# Patient Record
Sex: MALE | Race: WHITE | NOT HISPANIC OR LATINO | URBAN - METROPOLITAN AREA
[De-identification: names, ages, dates, MRNs, and addresses within clinical notes are randomized per-mention and may not be internally consistent; named-entity substitution may affect disease eponyms.]

---

## 2017-04-27 ENCOUNTER — OFFICE VISIT - HEALTHEAST (OUTPATIENT)
Dept: FAMILY MEDICINE | Facility: CLINIC | Age: 57
End: 2017-04-27

## 2017-04-27 ENCOUNTER — SURGERY - HEALTHEAST (OUTPATIENT)
Dept: CARDIOLOGY | Facility: CLINIC | Age: 57
End: 2017-04-27

## 2017-04-27 DIAGNOSIS — R07.9 CHEST PAIN: ICD-10-CM

## 2017-04-27 LAB
ATRIAL RATE - MUSE: 312 BPM
ATRIAL RATE - MUSE: 76 BPM
DIASTOLIC BLOOD PRESSURE - MUSE: NORMAL MMHG
DIASTOLIC BLOOD PRESSURE - MUSE: NORMAL MMHG
INTERPRETATION ECG - MUSE: NORMAL
INTERPRETATION ECG - MUSE: NORMAL
P AXIS - MUSE: 2 DEGREES
P AXIS - MUSE: NORMAL DEGREES
PR INTERVAL - MUSE: 240 MS
PR INTERVAL - MUSE: NORMAL MS
QRS DURATION - MUSE: 112 MS
QRS DURATION - MUSE: 114 MS
QT - MUSE: 380 MS
QT - MUSE: 392 MS
QTC - MUSE: 441 MS
QTC - MUSE: 454 MS
R AXIS - MUSE: -18 DEGREES
R AXIS - MUSE: 81 DEGREES
SYSTOLIC BLOOD PRESSURE - MUSE: NORMAL MMHG
SYSTOLIC BLOOD PRESSURE - MUSE: NORMAL MMHG
T AXIS - MUSE: -38 DEGREES
T AXIS - MUSE: 94 DEGREES
VENTRICULAR RATE- MUSE: 76 BPM
VENTRICULAR RATE- MUSE: 86 BPM

## 2017-04-28 ASSESSMENT — MIFFLIN-ST. JEOR: SCORE: 1661.5

## 2017-04-29 ASSESSMENT — MIFFLIN-ST. JEOR: SCORE: 1625.47

## 2017-05-16 ENCOUNTER — COMMUNICATION - HEALTHEAST (OUTPATIENT)
Dept: CARDIOLOGY | Facility: CLINIC | Age: 57
End: 2017-05-16

## 2017-06-02 ENCOUNTER — OFFICE VISIT - HEALTHEAST (OUTPATIENT)
Dept: CARDIOLOGY | Facility: CLINIC | Age: 57
End: 2017-06-02

## 2017-06-02 DIAGNOSIS — I25.10 CORONARY ARTERY DISEASE DUE TO LIPID RICH PLAQUE: ICD-10-CM

## 2017-06-02 DIAGNOSIS — I21.9 MYOCARDIAL INFARCTION (H): ICD-10-CM

## 2017-06-02 DIAGNOSIS — E78.5 DYSLIPIDEMIA, GOAL LDL BELOW 70: ICD-10-CM

## 2017-06-02 DIAGNOSIS — I25.83 CORONARY ARTERY DISEASE DUE TO LIPID RICH PLAQUE: ICD-10-CM

## 2017-06-02 DIAGNOSIS — D64.9 ANEMIA: ICD-10-CM

## 2017-06-02 LAB — LDLC SERPL CALC-MCNC: 55 MG/DL

## 2017-06-02 ASSESSMENT — MIFFLIN-ST. JEOR: SCORE: 1646.33

## 2017-06-05 ENCOUNTER — COMMUNICATION - HEALTHEAST (OUTPATIENT)
Dept: CARDIOLOGY | Facility: CLINIC | Age: 57
End: 2017-06-05

## 2017-07-06 ENCOUNTER — COMMUNICATION - HEALTHEAST (OUTPATIENT)
Dept: CARDIOLOGY | Facility: CLINIC | Age: 57
End: 2017-07-06

## 2017-07-10 ENCOUNTER — AMBULATORY - HEALTHEAST (OUTPATIENT)
Dept: CARDIOLOGY | Facility: CLINIC | Age: 57
End: 2017-07-10

## 2017-08-29 ENCOUNTER — RECORDS - HEALTHEAST (OUTPATIENT)
Dept: ADMINISTRATIVE | Facility: OTHER | Age: 57
End: 2017-08-29

## 2017-09-01 ENCOUNTER — OFFICE VISIT - HEALTHEAST (OUTPATIENT)
Dept: CARDIOLOGY | Facility: CLINIC | Age: 57
End: 2017-09-01

## 2017-09-01 DIAGNOSIS — E78.5 DYSLIPIDEMIA, GOAL LDL BELOW 70: ICD-10-CM

## 2017-09-01 DIAGNOSIS — I25.83 CORONARY ARTERY DISEASE DUE TO LIPID RICH PLAQUE: ICD-10-CM

## 2017-09-01 DIAGNOSIS — I25.10 CORONARY ARTERY DISEASE DUE TO LIPID RICH PLAQUE: ICD-10-CM

## 2017-09-01 RX ORDER — NITROGLYCERIN 0.4 MG/1
0.4 TABLET SUBLINGUAL EVERY 5 MIN PRN
Qty: 25 TABLET | Refills: 2 | Status: SHIPPED | OUTPATIENT
Start: 2017-09-01 | End: 2018-09-01

## 2017-09-01 ASSESSMENT — MIFFLIN-ST. JEOR: SCORE: 1623.65

## 2018-02-26 ENCOUNTER — COMMUNICATION - HEALTHEAST (OUTPATIENT)
Dept: CARDIOLOGY | Facility: CLINIC | Age: 58
End: 2018-02-26

## 2018-03-02 ENCOUNTER — COMMUNICATION - HEALTHEAST (OUTPATIENT)
Dept: ADMINISTRATIVE | Facility: CLINIC | Age: 58
End: 2018-03-02

## 2018-06-15 ENCOUNTER — AMBULATORY - HEALTHEAST (OUTPATIENT)
Dept: CARDIOLOGY | Facility: CLINIC | Age: 58
End: 2018-06-15

## 2018-06-15 ENCOUNTER — COMMUNICATION - HEALTHEAST (OUTPATIENT)
Dept: TELEHEALTH | Facility: CLINIC | Age: 58
End: 2018-06-15

## 2018-06-15 DIAGNOSIS — E78.5 DYSLIPIDEMIA, GOAL LDL BELOW 70: ICD-10-CM

## 2018-06-15 LAB
CHOLEST SERPL-MCNC: 112 MG/DL
FASTING STATUS PATIENT QL REPORTED: ABNORMAL
HDLC SERPL-MCNC: 39 MG/DL
LDLC SERPL CALC-MCNC: 65 MG/DL
TRIGL SERPL-MCNC: 40 MG/DL

## 2018-06-22 ENCOUNTER — OFFICE VISIT - HEALTHEAST (OUTPATIENT)
Dept: CARDIOLOGY | Facility: CLINIC | Age: 58
End: 2018-06-22

## 2018-06-22 DIAGNOSIS — I25.10 CORONARY ARTERY DISEASE DUE TO LIPID RICH PLAQUE: ICD-10-CM

## 2018-06-22 DIAGNOSIS — I25.83 CORONARY ARTERY DISEASE DUE TO LIPID RICH PLAQUE: ICD-10-CM

## 2018-06-22 DIAGNOSIS — E78.5 DYSLIPIDEMIA, GOAL LDL BELOW 70: ICD-10-CM

## 2018-06-22 RX ORDER — LISINOPRIL 5 MG/1
5 TABLET ORAL DAILY
Status: SHIPPED | COMMUNITY
Start: 2018-06-22

## 2018-06-22 ASSESSMENT — MIFFLIN-ST. JEOR: SCORE: 1664.48

## 2020-08-13 ENCOUNTER — RECORDS - HEALTHEAST (OUTPATIENT)
Dept: ADMINISTRATIVE | Facility: OTHER | Age: 60
End: 2020-08-13

## 2020-08-13 ENCOUNTER — AMBULATORY - HEALTHEAST (OUTPATIENT)
Dept: CARDIOLOGY | Facility: CLINIC | Age: 60
End: 2020-08-13

## 2020-08-20 ENCOUNTER — COMMUNICATION - HEALTHEAST (OUTPATIENT)
Dept: CARDIOLOGY | Facility: CLINIC | Age: 60
End: 2020-08-20

## 2020-08-21 ENCOUNTER — OFFICE VISIT - HEALTHEAST (OUTPATIENT)
Dept: CARDIOLOGY | Facility: CLINIC | Age: 60
End: 2020-08-21

## 2020-08-21 DIAGNOSIS — I25.10 CORONARY ARTERY DISEASE DUE TO LIPID RICH PLAQUE: ICD-10-CM

## 2020-08-21 DIAGNOSIS — I25.83 CORONARY ARTERY DISEASE DUE TO LIPID RICH PLAQUE: ICD-10-CM

## 2020-08-21 DIAGNOSIS — E78.5 DYSLIPIDEMIA, GOAL LDL BELOW 70: ICD-10-CM

## 2020-08-21 RX ORDER — METOPROLOL SUCCINATE 25 MG/1
25 TABLET, EXTENDED RELEASE ORAL DAILY
Status: SHIPPED | COMMUNITY
Start: 2020-07-06

## 2020-08-21 RX ORDER — CHLORAL HYDRATE 500 MG
1 CAPSULE ORAL DAILY
Status: SHIPPED | COMMUNITY
Start: 2020-08-21

## 2020-08-21 ASSESSMENT — MIFFLIN-ST. JEOR: SCORE: 1699.63

## 2021-05-30 VITALS — BODY MASS INDEX: 28.1 KG/M2 | HEIGHT: 68 IN | WEIGHT: 185.4 LBS

## 2021-05-31 VITALS — HEIGHT: 68 IN | WEIGHT: 185 LBS | BODY MASS INDEX: 28.04 KG/M2

## 2021-05-31 VITALS — WEIGHT: 190 LBS | HEIGHT: 68 IN | BODY MASS INDEX: 28.79 KG/M2

## 2021-06-01 VITALS — HEIGHT: 68 IN | BODY MASS INDEX: 29.4 KG/M2 | WEIGHT: 194 LBS

## 2021-06-04 VITALS
DIASTOLIC BLOOD PRESSURE: 86 MMHG | HEIGHT: 69 IN | WEIGHT: 200 LBS | RESPIRATION RATE: 16 BRPM | SYSTOLIC BLOOD PRESSURE: 138 MMHG | BODY MASS INDEX: 29.62 KG/M2 | HEART RATE: 72 BPM

## 2021-06-09 ENCOUNTER — RECORDS - HEALTHEAST (OUTPATIENT)
Dept: CARDIOLOGY | Facility: CLINIC | Age: 61
End: 2021-06-09

## 2021-06-10 NOTE — PROGRESS NOTES
[facility] Office Visit    Chief Complaint:  Chief Complaint   Patient presents with     Chest Pain         Assessment/Plan:  1. Chest pain  Concerning for a life threatening cardiac event- MI/disection, etc.  No further evaluation indicated in clinic setting.  Pt was stabilized and sent to [facility] ED for further evaluation and treatment by EMS.  He received 0.4mg SL NTG x2 in clinic setting, O2, and took asa prior to arrival at clinic.  EKG was poor quality but did show some ST changes.    - Electrocardiogram Perform - Clinic  - nitroglycerin SL tablet 0.4 mg (NITROSTAT); Place 1 tablet (0.4 mg total) under the tongue every 5 (five) minutes as needed for chest pain.  Administrations This Visit     nitroglycerin SL tablet 0.4 mg (NITROSTAT)     Admin Date Action Dose Route Administered By             04/27/2017 Given 0.8 mg Sublingual Luis Raymond CMA                        HPI:   Shar Melo is a 57 y.o. male c/o tightness in his chest and cold sweats.  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  Pt chewed 3 - 325 mg tablets asa while on his route.  Starting around 1:30 he developed a cold sweat. He notes that his symptoms were mild unless he was carrying a heavy box or going up stairs and then his tightness got bad.  He knew he had a delivery at [facility] and upon arriving came in and asked for medical evaluation for possible heart attack.  He was brought back to an exam room immediately.  He notes he smokes 1ppd and has white coat HTN but is not on any medications.  He sees a PMD in [place].      We got him laying down on the exam table.  He was given 15L O2 by mask.  Initial BP was 178/130.   He was given 0.4mg NTG SL and this was repeated 5 minutes later due to ongoing symptoms.  While he was hear he complained of a severe tightness in his chest that radiated to his throat, arms, and a severe  headache.  He also complained of tingling in both of his arms.  911 was contacted quickly while he was being evaluated.      Physical Exam:  BP (!) 178/130  Pulse 81  SpO2 100% There is no height or weight on file to calculate BMI. No LMP for male patient.  Vital signs reviewed  Wt Readings from Last 3 Encounters:   04/27/17 188 lb (85.3 kg)     History   Smoking Status     Current Every Day Smoker     Packs/day: 1.00     Types: Cigarettes   Smokeless Tobacco     Not on file     History   Sexual Activity     Sexual activity: Not on file     No Data Recorded  No Data Recorded  No Data Recorded  No Data Recorded    All normal as below except abnormalities include: pt appears nervous/frantic, uncomfortable, short of breath, cold, clammy, very pale, sweaty.  Has a hard time laying still.  Notes increased symptoms with movement.    Neck: Supple without lymphadenopathy or thyromegally  CV: Regular rate and rhythm S1S2 without rubs, murmurs or gallops,   Lungs: Clear to auscultation bilaterally  Abd:  +BS, soft NT/ND,  No masses or organomegally  Extremities: cold, No Edema, 2+ Pedal and radial pulses bilaterally  Skin: No lesions or rashes noted  Neuro/MSK: Able to ambulate around the exam room with equal movement, strength and normal coordination of the upper and lower extremeties symmetrically    No results found for this or any previous visit.    ROS:  10 point review of symptoms all negative except as outlined in the HPI above.    Med list and active problem list reviewed and updated as part of this encounter    Current Facility-Administered Medications on File Prior to Visit   Medication Dose Route Frequency Provider Last Rate Last Dose     aspirin chewable tablet 162 mg  162 mg Oral Once Kayli Olivera MD         heparin injection 5,100 Units  60 Units/kg Intravenous Once Kayli Olivera MD         [COMPLETED] morphine 4 mg/mL injection              morphine injection 2-4 mg  2-4 mg Intravenous  PRN Kayli Olivera MD         naloxone injection 0.2-0.4 mg (NARCAN)  0.2-0.4 mg Intravenous PRN Kayli Olivera MD        Or     naloxone injection 0.2-0.4 mg (NARCAN)  0.2-0.4 mg Intramuscular PRN Kayli Olivera MD         nitroglycerin 25 mg/250 mL in D5W (0.1 mg/mL)  5-100 mcg/min Intravenous Continuous Kayli Olivera MD         [COMPLETED] ticagrelor tablet 180 mg (BRILINTA)  180 mg Oral Once Kayli Olivera MD   180 mg at 04/27/17 7235     No current outpatient prescriptions on file prior to visit.         Laura France MD    This document was created using voice recognition software which may contain typographical errors.

## 2021-06-10 NOTE — TELEPHONE ENCOUNTER
Left message for patient requesting call back to complete Wellness screen prior to [facility] clinic appt tomorrow.  mg

## 2021-06-10 NOTE — TELEPHONE ENCOUNTER
Wellness Screening Tool  Symptom Screening:  Do you have one of the following NEW symptoms:    Fever (subjective or >100.0)?  No    A new cough?  No    Shortness of breath?  No     Chills? No     New loss of taste or smell? No     Generalized body aches? No     New persistent headache? No     New sore throat? No     Nausea, vomiting, or diarrhea?  No    Within the past 3 weeks, have you been exposed to someone with a known positive illness below:    COVID-19 (known or suspected)?  No    Chicken pox?  No    Mealses?  No    Pertussis?  No    Patient notified of visitor policy- They may have one person accompany them to their appointment, but they will need to wear a mask and will be screened upon arrival for symptoms: Yes  Pt informed to wear a mask: Yes  Pt notified if they develop any symptoms listed above, prior to their appointment, they are to call the clinic directly at [phone] for further instructions.  Yes  Patient's appointment status: Patient will be seen in clinic as scheduled on Fri 8-21-20 @ 1250 in  clinic with Dr. Tanner.  mg

## 2021-06-10 NOTE — PATIENT INSTRUCTIONS - HE
Shar,    It was a pleasure to see you today at [facility].    You will see Dr. Tanner in two years; please bring a copy of your most recent lipid profile.     Please call us if you have any questions or concerns about your heart.      Victor Hugo Tanner M.D.  [facility]

## 2021-06-15 PROBLEM — I25.83 CORONARY ARTERY DISEASE DUE TO LIPID RICH PLAQUE: Chronic | Status: ACTIVE | Noted: 2017-04-27

## 2021-06-15 PROBLEM — E78.5 DYSLIPIDEMIA, GOAL LDL BELOW 70: Chronic | Status: ACTIVE | Noted: 2017-04-27

## 2021-06-15 PROBLEM — I25.10 CORONARY ARTERY DISEASE DUE TO LIPID RICH PLAQUE: Chronic | Status: ACTIVE | Noted: 2017-04-27

## 2021-06-16 ENCOUNTER — RECORDS - HEALTHEAST (OUTPATIENT)
Dept: CARDIOLOGY | Facility: HOSPITAL | Age: 61
End: 2021-06-16

## 2021-06-16 DIAGNOSIS — I25.10 CAD (CORONARY ARTERY DISEASE): ICD-10-CM

## 2021-06-25 NOTE — PROGRESS NOTES
Progress Notes by Vitaly Tanner MD at 9/1/2017 10:10 AM     Author: Vitaly Tanner MD Service: -- Author Type: Physician    Filed: 9/1/2017 10:43 AM Encounter Date: 9/1/2017 Status: Signed    : Vitaly Tanner MD (Physician)           Click to link to [facility]     [facility] Clinic Note      Assessment:    1. Coronary artery disease due to lipid rich plaque  nitroglycerin (NITROSTAT) 0.4 MG SL tablet   2. Dyslipidemia, goal LDL below 70  Lipid Profile       Plan:    1.  Shar did not have nitroglycerin at home, so I prescribed it and explained how to use it.  2.  Continue current cardiovascular medications and return to see Dr. Tanner next spring after new lipid profile.  He may discontinue Brilinta on or around the one-year anniversary of his coronary stenting.    An After Visit Summary was printed and given to the patient.    Subjective:    Shar Melo returned for a planned 3 month follow up visit.    He has resumed driving for work.  He does not have any cardiac symptoms this time.  He does experience brief twinges of discomfort in the pectoral muscles that lasts less than 2 seconds.  He denies shortness of breath, lightheadedness or edema.    He does experience nocturia as well as heartburn after certain foods.    Shar has not noticed any difficulties with taking his cardiovascular medications.    Past Medical History:    Patient Active Problem List   Diagnosis   ? Coronary artery disease due to lipid rich plaque   ? Dyslipidemia, goal LDL below 70       Past Medical History:   Diagnosis Date   ? Acute ST elevation myocardial infarction (STEMI) involving right coronary artery 4/27/2017   ? Coronary artery disease due to lipid rich plaque 4/27/2017   ? Dyslipidemia, goal LDL below 70 4/27/2017   ? White coat hypertension        Past Surgical History:   Procedure Laterality Date   ? CORONARY STENT PLACEMENT  04/27/2017    LOW to prox RCA by Dr. Mondragon   ? HAND SURGERY  Right    ? DE CATH PLACEMENT & NJX CORONARY ART ANGIO IMG S&I N/A 4/27/2017    Procedure: Coronary Angiogram;  Surgeon: Ayo Mondragon MD;  Location: [facility] Cath Lab;  Service: Cardiology   ? DE L HRT CATH W/NJX L VENTRICULOGRAPHY IM S&I N/A 4/27/2017    Procedure: Left Heart Catheterization with Left Ventriculogram;  Surgeon: Ayo Mondragon MD;  Location: [facility] Cath Lab;  Service: Cardiology        reports that he quit smoking about 4 months ago. His smoking use included Cigarettes. He has a 20.00 pack-year smoking history. He has quit using smokeless tobacco. He reports that he drinks alcohol. He reports that he does not use illicit drugs.    Family History   Problem Relation Age of Onset   ? No Medical Problems Mother    ? CABG Father 78   ? Lymphoma Father    ? No Medical Problems Brother    ? No Medical Problems Brother    ? No Medical Problems Sister        Outpatient Encounter Prescriptions as of 9/1/2017   Medication Sig Dispense Refill   ? aspirin 81 MG EC tablet Take 1 tablet (81 mg total) by mouth daily.  0   ? atorvastatin (LIPITOR) 80 MG tablet Take 1 tablet (80 mg total) by mouth daily. 30 tablet 1   ? lisinopril (PRINIVIL) 10 MG tablet Take 0.5 tablets (5 mg total) by mouth daily. 45 tablet 3   ? metoprolol succinate (TOPROL-XL) 25 MG Take 1 tablet (25 mg total) by mouth daily. 30 tablet 1   ? ticagrelor (BRILINTA) 90 mg Tab Take 1 tablet (90 mg total) by mouth 2 (two) times a day. 60 tablet 0   ? nitroglycerin (NITROSTAT) 0.4 MG SL tablet Place 1 tablet (0.4 mg total) under the tongue every 5 (five) minutes as needed for chest pain. 25 tablet 2     No facility-administered encounter medications on file as of 9/1/2017.        Review of Systems:     General: WNL  Eyes: WNL  Ears/Nose/Throat: WNL  Lungs: WNL  Heart: Chest Pain, Shortness of Breath with activity  Stomach: Heartburn  Bladder: Frequent Urination at Night  Muscle/Joints: WNL  Skin: WNL  Nervous System: Daytime  "Sleepiness  Mental Health: WNL     Blood: Easy Bruising    Objective:     /84 (Patient Site: Left Arm, Patient Position: Sitting, Cuff Size: Adult Large)  Pulse 64  Resp 16  Ht 5' 8\" (1.727 m)  Wt 185 lb (83.9 kg)  BMI 28.13 kg/m2  Wt Readings from Last 5 Encounters:   09/01/17 185 lb (83.9 kg)   06/02/17 190 lb (86.2 kg)   04/29/17 185 lb 6.4 oz (84.1 kg)        Physical Exam:    GENERAL APPEARANCE: alert, no apparent distress  EYES: no scleral icterus  NECK: no carotid bruits or adenopathy, jugular venous pressure is less than 5 cm  CHEST: symmetric, the lungs are clear to auscultation  CARDIOVASCULAR: regular rhythm without murmur or gallop  EXTREMITIES: no cyanosis, clubbing or edema  SKIN: no xanthelasma  NEUROLOGIC: normal gait and coordination  PSYCHIATRIC: mood and affect are normal    Cardiac Testing:    No new testing.    Imaging:    No results found.    Lab Results:    Lab Results   Component Value Date    CREATININE 0.77 04/28/2017    BUN 16 04/28/2017     04/28/2017    K 4.2 04/29/2017     (H) 04/28/2017    CO2 19 (L) 04/28/2017     Lab Results   Component Value Date    CHOL 167 04/28/2017    TRIG 53 04/28/2017    HDL 32 (L) 04/28/2017    LDLDIRECT 55 06/02/2017     Creatinine (mg/dL)   Date Value   04/28/2017 0.77   04/27/2017 0.93     LDL Calculated (mg/dL)   Date Value   04/28/2017 124     Lab Results   Component Value Date    WBC 13.9 (H) 04/27/2017    HGB 15.4 06/02/2017    HCT 42.0 04/27/2017    MCV 96 04/27/2017     04/28/2017           Much or all of the text in this note was generated through the use of the Dragon Dictate voice-to-text software. Errors in spelling or words which seem out of context are unintentional. Sound alike errors, in particular, may have escaped editing.           "

## 2021-06-25 NOTE — PROGRESS NOTES
Progress Notes by Vitaly Tanner MD at 6/2/2017  1:50 PM     Author: Vitaly Tanner MD Service: -- Author Type: Physician    Filed: 6/2/2017  2:22 PM Encounter Date: 6/2/2017 Status: Signed    : Vitaly Tanner MD (Physician)           Click to link to [facility]     [facility] Clinic Note      Assessment:    1. Coronary artery disease due to lipid rich plaque     2. Dyslipidemia, goal LDL below 70  LDL Cholesterol, Direct   3. Anemia  Hemoglobin       Plan:    1.  He will reduce the lisinopril to 5 mg daily due to his fatigue and relative hypotension; it can be stopped at follow up with his personal physician if he does not improve.  2.  We will call him with the results of the laboratory testing drawn today.  3.  There are no cardiac contraindications to returning to driving.  4.  Return to see Dr. Tanner in 3 months.    He was congratulated on not smoking for the last 34 days.    An After Visit Summary was printed and given to the patient.    Subjective:    Shar Melo returned for a planned post hospital follow up visit.    He had an acute inferior wall myocardial infarction in April and received a drug-eluting stent to the right coronary artery.  He was discharged on very low-dose metoprolol as well as 10 mg a day of lisinopril by the hospitalist.  Has been feeling fatigued but has not had lightheadedness or presyncope.  He gets short of breath but only with moderately strenuous exertion.  He does not experience chest discomfort.  He has chronic problems with acid reflux.  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  He will be meeting with the medical examiner about that in the near future.  He plans to follow-up with his personal physician in several weeks time, as well.    Past Medical History:    Patient Active Problem List   Diagnosis   ? Coronary artery disease due to lipid rich plaque   ? Dyslipidemia, goal  LDL below 70       Past Medical History:   Diagnosis Date   ? Acute ST elevation myocardial infarction (STEMI) involving right coronary artery 4/27/2017   ? Coronary artery disease due to lipid rich plaque 4/27/2017   ? Dyslipidemia, goal LDL below 70 4/27/2017   ? White coat hypertension        Past Surgical History:   Procedure Laterality Date   ? CORONARY STENT PLACEMENT  04/27/2017    LOW to prox RCA by Dr. Mondragon   ? HAND SURGERY Right    ? LA CATH PLACEMENT & NJX CORONARY ART ANGIO IMG S&I N/A 4/27/2017    Procedure: Coronary Angiogram;  Surgeon: Ayo Mondragon MD;  Location: [facility] Cath Lab;  Service: Cardiology   ? LA L HRT CATH W/NJX L VENTRICULOGRAPHY IMG S&I N/A 4/27/2017    Procedure: Left Heart Catheterization with Left Ventriculogram;  Surgeon: Ayo Mondragon MD;  Location: [facility] Cath Lab;  Service: Cardiology        reports that he quit smoking about 4 weeks ago. His smoking use included Cigarettes. He has a 20.00 pack-year smoking history. He does not have any smokeless tobacco history on file. He reports that he drinks alcohol. He reports that he does not use illicit drugs.    Family History   Problem Relation Age of Onset   ? No Medical Problems Mother    ? CABG Father 78   ? Lymphoma Father    ? No Medical Problems Brother    ? No Medical Problems Brother    ? No Medical Problems Sister        Outpatient Encounter Prescriptions as of 6/2/2017   Medication Sig Dispense Refill   ? aspirin 81 MG EC tablet Take 1 tablet (81 mg total) by mouth daily.  0   ? atorvastatin (LIPITOR) 80 MG tablet Take 1 tablet (80 mg total) by mouth daily. 30 tablet 1   ? lisinopril (PRINIVIL) 10 MG tablet Take 0.5 tablets (5 mg total) by mouth daily. 45 tablet 3   ? metoprolol succinate (TOPROL-XL) 25 MG Take 1 tablet (25 mg total) by mouth daily. 30 tablet 1   ? ticagrelor (BRILINTA) 90 mg Tab Take 1 tablet (90 mg total) by mouth 2 (two) times a day. 60 tablet 0   ? [DISCONTINUED] lisinopril (PRINIVIL)  "10 MG tablet Take 1 tablet (10 mg total) by mouth daily. 30 tablet 0     No facility-administered encounter medications on file as of 6/2/2017.        Review of Systems:     General: WNL  Eyes: WNL  Ears/Nose/Throat: WNL  Lungs: WNL  Heart: Shortness of Breath with activity  Stomach: Heartburn  Bladder: WNL  Muscle/Joints: WNL  Skin: WNL  Nervous System: WNL  Mental Health: WNL     Blood: WNL    Objective:     BP (!) 84/60 (Patient Site: Right Arm, Patient Position: Sitting, Cuff Size: Adult Regular)  Pulse 100  Resp 16  Ht 5' 8\" (1.727 m)  Wt 190 lb (86.2 kg)  BMI 28.89 kg/m2  Wt Readings from Last 5 Encounters:   06/02/17 190 lb (86.2 kg)   04/29/17 185 lb 6.4 oz (84.1 kg)        Physical Exam:    GENERAL APPEARANCE: alert, no apparent distress  EYES: no scleral icterus  NECK: no carotid bruits or adenopathy, jugular venous pressure is less than 5 cm  CHEST: symmetric, the lungs are clear to auscultation  CARDIOVASCULAR: regular rhythm without murmur or gallop  EXTREMITIES: no cyanosis, clubbing or edema  SKIN: no xanthelasma  NEUROLOGIC: normal gait and coordination  PSYCHIATRIC: mood and affect are normal    Cardiac Testing:    Echocardiogram:      No previous study for comparison.    Left ventricle ejection fraction is normal. Left ventricular ejection fraction estimated 55-60%    Basilar inferior/posterior wall appears hypokinetic    No significant valve abnormality    Right Ventricle: Normal size and systolic function     Imaging:    No results found.    Lab Results:    Lab Results   Component Value Date    CREATININE 0.77 04/28/2017    BUN 16 04/28/2017     04/28/2017    K 4.2 04/29/2017     (H) 04/28/2017    CO2 19 (L) 04/28/2017     Lab Results   Component Value Date    CHOL 167 04/28/2017    TRIG 53 04/28/2017    HDL 32 (L) 04/28/2017     Creatinine (mg/dL)   Date Value   04/28/2017 0.77   04/27/2017 0.93     LDL Calculated (mg/dL)   Date Value   04/28/2017 124     Lab Results   Component " Value Date    WBC 13.9 (H) 04/27/2017    HGB 13.4 (L) 04/28/2017    HCT 42.0 04/27/2017    MCV 96 04/27/2017     04/28/2017           Much or all of the text in this note was generated through the use of the Dragon Dictate voice-to-text software. Errors in spelling or words which seem out of context are unintentional. Sound alike errors, in particular, may have escaped editing.

## 2021-06-26 NOTE — PROGRESS NOTES
Progress Notes by Vitaly Tanner MD at 6/22/2018  9:50 AM     Author: Vitaly Tanner MD Service: -- Author Type: Physician    Filed: 6/22/2018 10:28 AM Encounter Date: 6/22/2018 Status: Signed    : Vitaly Tanner MD (Physician)           Click to link to [facility] Note      Assessment:    1. Coronary artery disease due to lipid rich plaque     2. Dyslipidemia, goal LDL below 70         Plan:    1. Discontinue Brilinta as it has been more than a year since his coronary stenting.  Stay on aspirin for life.  2. Reduce metoprolol succinate to 12-1/2 mg orally daily for 2 weeks, then recheck his blood pressure, and if it is normal discontinue metoprolol altogether.  3. Return to see Dr. Tanner in 2 years after a new lipid profile.  Shar understands that his primary provider will need to renew his medications in the meantime.  4. 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    An After Visit Summary was printed and given to the patient.    Subjective:    Shar Melo returned for a planned follow up visit.    He feels fatigued when he exerts himself such as carrying a package up steps.  He does not describe angina.  He thinks it is related to his medications.  He is on a very small dose of metoprolol and we reduced his lisinopril dose last fall.  He continued taking Brilinta past the one-year anniversary of his stenting despite my advice last year that he could stop it on April 27 of this year.    Shar has not smoked cigarettes in the last 14 months and is very pleased about this.  He states he saved over $4000.  I congratulated him on his success in staying off cigarettes.    Past Medical History:    Patient Active Problem List   Diagnosis   ? Coronary artery disease due to lipid rich plaque   ? Dyslipidemia, goal LDL below 70       Past Medical History:   Diagnosis Date   ? Acute ST elevation  myocardial infarction (STEMI) involving right coronary artery (H) 4/27/2017   ? Coronary artery disease due to lipid rich plaque 4/27/2017   ? Dyslipidemia, goal LDL below 70 4/27/2017   ? White coat hypertension        Past Surgical History:   Procedure Laterality Date   ? CORONARY STENT PLACEMENT  04/27/2017    LOW to prox RCA by Dr. Mondragon   ? HAND SURGERY Right    ? [place] CATH PLACEMENT & NJX CORONARY ART ANGIO IMG S&I N/A 4/27/2017    Procedure: Coronary Angiogram;  Surgeon: Ayo Mondragon MD;  Location: [facility] Cath Lab;  Service: Cardiology   ? [place] L HRT CATH W/NJX L VENTRICULOGRAPHY IMG S&I N/A 4/27/2017    Procedure: Left Heart Catheterization with Left Ventriculogram;  Surgeon: Ayo Mondragon MD;  Location: [facility] Cath Lab;  Service: Cardiology        reports that he quit smoking about 13 months ago. His smoking use included Cigarettes. He has a 20.00 pack-year smoking history. He has quit using smokeless tobacco. He reports that he drinks alcohol. He reports that he does not use illicit drugs.    Family History   Problem Relation Age of Onset   ? No Medical Problems Mother    ? CABG Father 78   ? Lymphoma Father    ? No Medical Problems Brother    ? No Medical Problems Brother    ? No Medical Problems Sister        Outpatient Encounter Prescriptions as of 6/22/2018   Medication Sig Dispense Refill   ? aspirin 81 MG EC tablet Take 1 tablet (81 mg total) by mouth daily.  0   ? atorvastatin (LIPITOR) 80 MG tablet Take 1 tablet (80 mg total) by mouth daily. 30 tablet 1   ? lisinopril (PRINIVIL,ZESTRIL) 5 MG tablet Take 5 mg by mouth daily.     ? nitroglycerin (NITROSTAT) 0.4 MG SL tablet Place 1 tablet (0.4 mg total) under the tongue every 5 (five) minutes as needed for chest pain. 25 tablet 2   ? [DISCONTINUED] metoprolol succinate (TOPROL-XL) 25 MG Take 1 tablet (25 mg total) by mouth daily. 30 tablet 1   ? [DISCONTINUED] ticagrelor (BRILINTA) 90 mg Tab Take 1 tablet (90 mg total) by mouth 2  "(two) times a day. 60 tablet 0     No facility-administered encounter medications on file as of 6/22/2018.        Review of Systems:     General: Weight Gain  Eyes: WNL  Ears/Nose/Throat: WNL  Lungs: Shortness of Breath  Heart: Shortness of Breath with activity  Stomach: Heartburn  Bladder: Frequent Urination at Night  Muscle/Joints: WNL  Skin: WNL  Nervous System: Daytime Sleepiness  Mental Health: WNL     Blood: Easy Bleeding    Objective:     /80 (Patient Site: Left Arm, Patient Position: Sitting, Cuff Size: Adult Regular)  Pulse 80  Ht 5' 8\" (1.727 m)  Wt 194 lb (88 kg)  BMI 29.5 kg/m2  Wt Readings from Last 5 Encounters:   06/22/18 194 lb (88 kg)   09/01/17 185 lb (83.9 kg)   06/02/17 190 lb (86.2 kg)   04/29/17 185 lb 6.4 oz (84.1 kg)        Physical Exam:    GENERAL APPEARANCE: alert, no apparent distress  EYES: no scleral icterus  NECK: no carotid bruits or adenopathy, jugular venous pressure is less than 5 cm  CHEST: symmetric, the lungs are clear to auscultation  CARDIOVASCULAR: regular rhythm without murmur or gallop  EXTREMITIES: no cyanosis, clubbing or edema  SKIN: no xanthelasma  NEUROLOGIC: normal gait and coordination  PSYCHIATRIC: mood and affect are normal    Cardiac Testing:    Echocardiogram:   Results for orders placed during the hospital encounter of 04/27/17   Echo Complete [ECH10] 04/28/2017    Narrative   No previous study for comparison.    Left ventricle ejection fraction is normal. Left ventricular ejection   fraction estimated 55-60%    Basilar inferior/posterior wall appears hypokinetic    No significant valve abnormality    Right Ventricle: Normal size and systolic function          Cardiac Catheterization:   Results for orders placed during the hospital encounter of 04/27/17   Cardiac Catheterization [CATH01] 04/27/2017    Addendum   Estimated blood loss was <20 ml.   A STENT SYNERGY MR [id number] drug eluting stent was  successfully placed.   Prox RCA lesion 100% " stenosed.   Mid RCA-2 lesion 40% stenosed.   Mid RCA-1 lesion 35% stenosed.   Dist RCA lesion 35% stenosed.   1st Mrg lesion 30% stenosed.   Prox LAD to Mid LAD lesion 30% stenosed.   Ost 1st Diag to 1st Diag lesion 50% stenosed.   Ost 2nd Diag lesion 75% stenosed.   Mid LAD lesion 30% stenosed.   The left ventricular size is normal. The left ventricular systolic  function is normal. LV systolic pressure is normal. LV end diastolic  pressure is normal. There are no wall motion abnormalities in the left  ventricle.   The ejection fraction is greater than 55% by visual estimate.        Ayo Mondragon MD 4/27/2017  3:43 PM          Narrative   Estimated blood loss was <20 ml.    A STENT SYNERGY MR [id number] drug eluting stent was   successfully placed.    Prox RCA lesion 100% stenosed.    Mid RCA-2 lesion 40% stenosed.    Mid RCA-1 lesion 35% stenosed.    Dist RCA lesion 35% stenosed.    1st Mrg lesion 30% stenosed.    Prox LAD to Mid LAD lesion 30% stenosed.    Ost 1st Diag to 1st Diag lesion 50% stenosed.    Ost 2nd Diag lesion 75% stenosed.    Mid LAD lesion 30% stenosed.    The left ventricular size is normal. The left ventricular systolic   function is normal. LV systolic pressure is normal. LV end diastolic   pressure is normal. There are no wall motion abnormalities in the left   ventricle.    The ejection fraction is greater than 55% by visual estimate.          Imaging:    No results found.    Lab Results:    Lab Results   Component Value Date    CREATININE 0.77 04/28/2017    BUN 16 04/28/2017     04/28/2017    K 4.2 04/29/2017     (H) 04/28/2017    CO2 19 (L) 04/28/2017     Lab Results   Component Value Date    CHOL 112 06/15/2018    TRIG 40 06/15/2018    HDL 39 (L) 06/15/2018    LDLDIRECT 55 06/02/2017     Creatinine (mg/dL)   Date Value   04/28/2017 0.77   04/27/2017 0.93     LDL Calculated (mg/dL)   Date Value   06/15/2018 65   04/28/2017 124     Lab Results   Component Value Date     WBC 13.9 (H) 04/27/2017    HGB 15.4 06/02/2017    HCT 42.0 04/27/2017    MCV 96 04/27/2017     04/28/2017           Much or all of the text in this note was generated through the use of the Dragon Dictate voice-to-text software. Errors in spelling or words which seem out of context are unintentional. Sound alike errors, in particular, may have escaped editing.

## 2021-06-29 NOTE — PROGRESS NOTES
Progress Notes by Vitaly Tanner MD at 8/21/2020 12:50 PM     Author: Vitaly Tanner MD Service: -- Author Type: Physician    Filed: 8/21/2020  1:21 PM Encounter Date: 8/21/2020 Status: Signed    : Vitaly Tanner MD (Physician)             Assessment:    1. Coronary artery disease due to lipid rich plaque     2. Dyslipidemia, goal LDL below 70         Plan:    1. Continue current cardiovascular medical therapy.  2. Return to see Dr. Tanner in 2 years; as he did today, he will bring a copy of his most recent lipid profile from his primary care clinic.    An After Visit Summary was printed and given to the patient.    Subjective:    Shar Melo returned for a planned biannual follow up visit.    Shar reports that he continues to refrain from smoking cigarettes and states that he has now saved himself $14,000 since quitting smoking.  The fatigue that he described 2 years ago resolved without discontinuing metoprolol so he remains on a small dose of that as well as lisinopril.  He describes no difficulties in taking his cardiovascular medications.    Shar is single and lives alone.  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  He has no symptoms of angina pectoris, effort intolerance, unusual shortness of breath, orthopnea, palpitations, syncope, or lower extremity edema.    Past Medical History:    Patient Active Problem List   Diagnosis   ? Coronary artery disease due to lipid rich plaque   ? Dyslipidemia, goal LDL below 70       Past Medical History:   Diagnosis Date   ? Acute ST elevation myocardial infarction (STEMI) involving right coronary artery (H) 4/27/2017   ? Coronary artery disease due to lipid rich plaque 4/27/2017   ? Dyslipidemia, goal LDL below 70 4/27/2017   ? White coat hypertension        Past Surgical History:   Procedure Laterality Date   ? CORONARY STENT PLACEMENT  04/27/2017    LOW to prox RCA by Dr. Mondragon   ? HAND SURGERY Right    ? [place]  [facility] [place]    Coronary Angiogram; Ayo Mondragon MD; [facility] Cath Lab;  Service: Cardiology   ? [place] L HRT [place] W/[place] [facility] [place]    Left Heart Catheterization with Left Ventriculogram; Ayo Mondragon MD; [facility] Cath Lab;  Service: Cardiology        reports that he quit smoking about 3 years ago. His smoking use included cigarettes. He has a 20.00 pack-year smoking history. He has quit using smokeless tobacco. He reports current alcohol use. He reports that he does not use drugs.    Family History   Problem Relation Age of Onset   ? No Medical Problems Mother    ? CABG Father 78   ? Lymphoma Father    ? No Medical Problems Brother    ? No Medical Problems Brother    ? No Medical Problems Sister        Outpatient Encounter Medications as of 8/21/2020   Medication Sig Dispense Refill   ? aspirin 81 MG EC tablet Take 1 tablet (81 mg total) by mouth daily.  0   ? atorvastatin (LIPITOR) 80 MG tablet Take 1 tablet (80 mg total) by mouth daily. 30 tablet 1   ? cholecalciferol, vitamin D3, 50 mcg (2,000 unit) Tab Take 2,000 Units by mouth daily.     ? fish oil-omega-3 fatty acids (FISH OIL) 300-1,000 mg capsule Take 1 g by mouth daily.     ? lisinopril (PRINIVIL,ZESTRIL) 5 MG tablet Take 5 mg by mouth daily.     ? metoprolol succinate (TOPROL-XL) 25 MG Take 25 mg by mouth daily.     ? omeprazole (PRILOSEC) 20 MG capsule Take 20 mg by mouth daily before breakfast.     ? nitroglycerin (NITROSTAT) 0.4 MG SL tablet Place 1 tablet (0.4 mg total) under the tongue every 5 (five) minutes as needed for chest pain. 25 tablet 2     No facility-administered encounter medications on file as of 8/21/2020.        Review of Systems:     General: WNL  Eyes: WNL  Ears/Nose/Throat: WNL  Lungs: WNL  Heart: Shortness of Breath with activity  Stomach: Heartburn  Bladder: Frequent Urination at Night  Muscle/Joints: Muscle Weakness  Skin: WNL  Nervous System: WNL  Mental Health: WNL     Blood:  "WNL    Objective:     /86 (Patient Site: Left Arm, Patient Position: Sitting, Cuff Size: Adult Regular)   Pulse 72   Resp 16   Ht 5' 8.5\" (1.74 m)   Wt 200 lb (90.7 kg)   BMI 29.97 kg/m    Wt Readings from Last 5 Encounters:   08/21/20 200 lb (90.7 kg)   06/22/18 194 lb (88 kg)   09/01/17 185 lb (83.9 kg)   06/02/17 190 lb (86.2 kg)   04/29/17 185 lb 6.4 oz (84.1 kg)        Physical Exam:    GENERAL APPEARANCE: alert, no apparent distress  EYES: no scleral icterus  NECK: no carotid bruits or adenopathy, jugular venous pressure is less than 5 cm  CHEST: symmetric, the lungs are clear to auscultation  CARDIOVASCULAR: regular rhythm without murmur or gallop  EXTREMITIES: no cyanosis, clubbing or edema  SKIN: no xanthelasma  NEUROLOGIC: normal gait and coordination  PSYCHIATRIC: mood and affect are normal    Cardiac Testing:    Echocardiogram:   Results for orders placed during the hospital encounter of 04/27/17   Echo Complete [ECH10] 04/28/2017    Narrative   No previous study for comparison.    Left ventricle ejection fraction is normal. Left ventricular ejection   fraction estimated 55-60%    Basilar inferior/posterior wall appears hypokinetic    No significant valve abnormality    Right Ventricle: Normal size and systolic function          Imaging:    No results found.    Lab Results:    Lab Results   Component Value Date    CREATININE 0.77 04/28/2017    BUN 16 04/28/2017     04/28/2017    K 4.2 04/29/2017     (H) 04/28/2017    CO2 19 (L) 04/28/2017     Lab Results   Component Value Date    CHOL 112 06/15/2018    TRIG 40 06/15/2018    HDL 39 (L) 06/15/2018    LDLDIRECT 55 06/02/2017     Creatinine (mg/dL)   Date Value   04/28/2017 0.77   04/27/2017 0.93     LDL was 52 mg/dl on August 7, 2020 at the [facility] [place]    LDL Calculated (mg/dL)   Date Value   06/15/2018 65   04/28/2017 124     Lab Results   Component Value Date    WBC 13.9 (H) 04/27/2017    HGB 15.4 06/02/2017    HCT 42.0 " 04/27/2017    MCV 96 04/27/2017     04/28/2017           Much or all of the text in this note was generated through the use of the Dragon Dictate voice-to-text software. Errors in spelling or words which seem out of context are unintentional. Sound alike errors, in particular, may have escaped editing.

## 2021-07-14 PROBLEM — I21.11 ACUTE ST ELEVATION MYOCARDIAL INFARCTION (STEMI) INVOLVING RIGHT CORONARY ARTERY (H): Status: RESOLVED | Noted: 2017-04-27 | Resolved: 2017-06-02

## 2021-07-14 PROBLEM — I21.19 ST ELEVATION MYOCARDIAL INFARCTION (STEMI) OF INFERIOR WALL (H): Status: RESOLVED | Noted: 2017-04-27 | Resolved: 2017-04-28
